# Patient Record
Sex: MALE | Race: BLACK OR AFRICAN AMERICAN | NOT HISPANIC OR LATINO | URBAN - METROPOLITAN AREA
[De-identification: names, ages, dates, MRNs, and addresses within clinical notes are randomized per-mention and may not be internally consistent; named-entity substitution may affect disease eponyms.]

---

## 2025-07-17 ENCOUNTER — OFFICE VISIT (OUTPATIENT)
Dept: URGENT CARE | Age: 65
End: 2025-07-17
Payer: OTHER GOVERNMENT

## 2025-07-17 ENCOUNTER — ANCILLARY PROCEDURE (OUTPATIENT)
Dept: URGENT CARE | Age: 65
End: 2025-07-17
Payer: OTHER GOVERNMENT

## 2025-07-17 VITALS
SYSTOLIC BLOOD PRESSURE: 144 MMHG | OXYGEN SATURATION: 97 % | DIASTOLIC BLOOD PRESSURE: 69 MMHG | RESPIRATION RATE: 20 BRPM | TEMPERATURE: 98.1 F | HEART RATE: 71 BPM

## 2025-07-17 DIAGNOSIS — J02.9 SORE THROAT: ICD-10-CM

## 2025-07-17 DIAGNOSIS — R09.89 CHEST CONGESTION: ICD-10-CM

## 2025-07-17 DIAGNOSIS — J30.9 ALLERGIC RHINITIS, UNSPECIFIED SEASONALITY, UNSPECIFIED TRIGGER: Primary | ICD-10-CM

## 2025-07-17 PROBLEM — R42 DIZZINESS: Status: ACTIVE | Noted: 2021-10-05

## 2025-07-17 PROBLEM — E55.9 VITAMIN D DEFICIENCY: Status: ACTIVE | Noted: 2025-07-17

## 2025-07-17 PROBLEM — E78.5 HYPERLIPEMIA: Status: ACTIVE | Noted: 2025-07-17

## 2025-07-17 PROBLEM — H81.10 BENIGN PAROXYSMAL POSITIONAL VERTIGO: Status: ACTIVE | Noted: 2021-10-05

## 2025-07-17 PROBLEM — I10 BENIGN ESSENTIAL HYPERTENSION: Status: ACTIVE | Noted: 2025-07-17

## 2025-07-17 PROBLEM — K21.9 GERD (GASTROESOPHAGEAL REFLUX DISEASE): Status: ACTIVE | Noted: 2025-07-17

## 2025-07-17 PROBLEM — I65.29 CAROTID ARTERY STENOSIS: Status: ACTIVE | Noted: 2021-10-05

## 2025-07-17 PROBLEM — G43.909 MIGRAINES: Status: ACTIVE | Noted: 2025-07-17

## 2025-07-17 PROBLEM — H93.A9 SUBJECTIVE PULSATILE TINNITUS: Status: ACTIVE | Noted: 2021-10-05

## 2025-07-17 LAB
POC HUMAN RHINOVIRUS PCR: NEGATIVE
POC INFLUENZA A VIRUS PCR: NEGATIVE
POC INFLUENZA B VIRUS PCR: NEGATIVE
POC RESPIRATORY SYNCYTIAL VIRUS PCR: NEGATIVE
POC STREPTOCOCCUS PYOGENES (GROUP A STREP) PCR: NEGATIVE

## 2025-07-17 PROCEDURE — 71046 X-RAY EXAM CHEST 2 VIEWS: CPT | Performed by: NURSE PRACTITIONER

## 2025-07-17 RX ORDER — SUMATRIPTAN SUCCINATE 100 MG/1
TABLET ORAL
COMMUNITY

## 2025-07-17 RX ORDER — ZOLPIDEM TARTRATE 10 MG/1
1 TABLET ORAL NIGHTLY PRN
COMMUNITY
Start: 2015-12-16

## 2025-07-17 RX ORDER — METOPROLOL SUCCINATE 100 MG/1
1 TABLET, EXTENDED RELEASE ORAL DAILY
COMMUNITY
Start: 2015-04-09

## 2025-07-17 RX ORDER — ESOMEPRAZOLE MAGNESIUM 40 MG/1
1 CAPSULE, DELAYED RELEASE ORAL DAILY
COMMUNITY
Start: 2015-12-19

## 2025-07-17 RX ORDER — AMLODIPINE BESYLATE 10 MG/1
1 TABLET ORAL DAILY
COMMUNITY
Start: 2015-10-04

## 2025-07-17 RX ORDER — CYCLOBENZAPRINE HCL 10 MG
1 TABLET ORAL NIGHTLY PRN
COMMUNITY

## 2025-07-17 RX ORDER — SERTRALINE HYDROCHLORIDE 50 MG/1
50 TABLET, FILM COATED ORAL DAILY
COMMUNITY

## 2025-07-17 RX ORDER — SPIRONOLACTONE 25 MG/1
25 TABLET ORAL DAILY
COMMUNITY

## 2025-07-17 RX ORDER — METHYLPREDNISOLONE 4 MG/1
TABLET ORAL
Qty: 21 TABLET | Refills: 0 | Status: SHIPPED | OUTPATIENT
Start: 2025-07-17 | End: 2025-07-23

## 2025-07-17 RX ORDER — SERTRALINE HYDROCHLORIDE 25 MG/1
25 TABLET, FILM COATED ORAL DAILY
COMMUNITY

## 2025-07-17 RX ORDER — TOPIRAMATE 100 MG/1
1 TABLET, FILM COATED ORAL NIGHTLY
COMMUNITY
Start: 2016-02-25

## 2025-07-17 ASSESSMENT — ENCOUNTER SYMPTOMS
SINUS COMPLAINT: 1
LOSS OF SENSATION IN FEET: 0
SINUS PRESSURE: 1
APPETITE CHANGE: 0
SORE THROAT: 1
NUMBNESS: 0
NAUSEA: 0
HEADACHES: 0
DEPRESSION: 0
COUGH: 0
FEVER: 0
VOMITING: 0
SHORTNESS OF BREATH: 0
PHOTOPHOBIA: 0
SINUS PAIN: 0
ARTHRALGIAS: 0
DIZZINESS: 0
ACTIVITY CHANGE: 0
OCCASIONAL FEELINGS OF UNSTEADINESS: 0
DIARRHEA: 0
RHINORRHEA: 1
CHILLS: 0
FATIGUE: 0
ABDOMINAL PAIN: 0
MYALGIAS: 0

## 2025-07-17 ASSESSMENT — PATIENT HEALTH QUESTIONNAIRE - PHQ9
SUM OF ALL RESPONSES TO PHQ9 QUESTIONS 1 AND 2: 0
1. LITTLE INTEREST OR PLEASURE IN DOING THINGS: NOT AT ALL
2. FEELING DOWN, DEPRESSED OR HOPELESS: NOT AT ALL

## 2025-07-17 NOTE — PATIENT INSTRUCTIONS
Thank you for letting me care for you today.  You have been seen today for worsening allergy symptoms  Start steroid pack , continue flonase and Allegra   Please follow up with your primary care provider/pediatrician as directed.   If one is needed, please call [phone].  Please seek care in emergency room for red flags as discussed during visit.

## 2025-07-17 NOTE — PROGRESS NOTES
"Subjective   Patient ID: Zeyad Cristina is a 64 y.o. male. They present today with a chief complaint of Sinus Problem (Pt presents with what he calls a sinus infection, said he has a itchy throat and said he is vibrating when he breaths and sore throat and congestion. ), Sore Throat, Nasal Congestion, and URI.    History of Present Illness  64-year-old male with a medical history of, limited to, seasonal allergies presents to the clinic today for evaluation of a 4-day complaint of worsening allergy symptoms.  Patient endorses worsening nasal congestion, runny nose, sore throat.  Patient states when he lays flat he hears a \"vibration\" in the upper left part of his left chest.  Patient states he is coughing at night.  Takes Flonase daily.  Had to start taking Allegra due to his current symptoms.  Denies chest pain, shortness of breath, dyspnea on exertion, wheezing, orthopnea, pedal edema.  Denies change in bowel or bladder function.  Denies GI complaints.      Sinus Problem  Associated symptoms: congestion, rhinorrhea and sore throat    Associated symptoms: no abdominal pain, no chest pain, no cough, no diarrhea, no ear pain, no fatigue, no fever, no headaches, no myalgias, no nausea, no rash, no shortness of breath and no vomiting    Sore Throat   Associated symptoms include congestion. Pertinent negatives include no abdominal pain, coughing, diarrhea, ear pain, headaches, shortness of breath or vomiting.   URI  Presenting symptoms: congestion, rhinorrhea and sore throat    Presenting symptoms: no cough, no ear pain, no fatigue and no fever    Associated symptoms: no arthralgias, no headaches, no myalgias and no sinus pain        Past Medical History  Allergies as of 07/17/2025 - Reviewed 07/17/2025   Allergen Reaction Noted    Nsaids (non-steroidal anti-inflammatory drug) Bleeding 07/17/2025       Prescriptions Prior to Admission[1]     Medical History[2]    Surgical History[3]     reports that he has never " smoked. He has never been exposed to tobacco smoke. He has never used smokeless tobacco. He reports that he does not drink alcohol and does not use drugs.    Review of Systems  Review of Systems   Constitutional:  Negative for activity change, appetite change, chills, fatigue and fever.   HENT:  Positive for congestion, rhinorrhea, sinus pressure and sore throat. Negative for ear pain and sinus pain.    Eyes:  Negative for photophobia and visual disturbance.   Respiratory:  Negative for cough and shortness of breath.    Cardiovascular:  Negative for chest pain and leg swelling.   Gastrointestinal:  Negative for abdominal pain, diarrhea, nausea and vomiting.   Musculoskeletal:  Negative for arthralgias and myalgias.   Skin:  Negative for rash.   Neurological:  Negative for dizziness, numbness and headaches.   All other systems reviewed and are negative.                                 Objective    Vitals:    07/17/25 1003   BP: 144/69   BP Location: Left arm   Patient Position: Sitting   Pulse: 71   Resp: 20   Temp: 36.7 °C (98.1 °F)   SpO2: 97%     No LMP for male patient.    Physical Exam  Vitals reviewed.   Constitutional:       Appearance: Normal appearance.   HENT:      Head: Normocephalic and atraumatic.      Right Ear: Tympanic membrane, ear canal and external ear normal.      Left Ear: Tympanic membrane, ear canal and external ear normal.      Nose: Mucosal edema, congestion and rhinorrhea present. Rhinorrhea is clear.      Right Turbinates: Enlarged and swollen.      Left Turbinates: Enlarged and swollen.      Right Sinus: No maxillary sinus tenderness or frontal sinus tenderness.      Left Sinus: No maxillary sinus tenderness or frontal sinus tenderness.      Mouth/Throat:      Mouth: Mucous membranes are moist.      Pharynx: Posterior oropharyngeal erythema and postnasal drip present. No oropharyngeal exudate.     Eyes:      Extraocular Movements: Extraocular movements intact.      Conjunctiva/sclera:  Conjunctivae normal.      Pupils: Pupils are equal, round, and reactive to light.       Cardiovascular:      Rate and Rhythm: Normal rate and regular rhythm.      Pulses: Normal pulses.      Heart sounds: Normal heart sounds.   Pulmonary:      Effort: Pulmonary effort is normal.      Breath sounds: Normal breath sounds.   Abdominal:      General: Bowel sounds are normal. There is no distension.     Musculoskeletal:         General: Normal range of motion.      Cervical back: Normal range of motion and neck supple.     Skin:     General: Skin is warm and dry.      Capillary Refill: Capillary refill takes less than 2 seconds.     Neurological:      General: No focal deficit present.      Mental Status: He is alert and oriented to person, place, and time.     Psychiatric:         Mood and Affect: Mood normal.         Behavior: Behavior normal.         Procedures    Point of Care Test & Imaging Results from this visit  Results for orders placed or performed in visit on 07/17/25   POCT SPOTFIRE R/ST Panel Mini w/Strep A ([facility]) manually resulted   Result Value Ref Range    POC Group A Strep, PCR Negative Negative    POC Respiratory Syncytial Virus PCR Negative Negative    POC Influenza A Virus PCR Negative Negative    POC Influenza B Virus PCR Negative Negative    POC Human Rhinovirus PCR Negative Negative      Imaging  XR chest 2 views  Result Date: 7/17/2025  No acute cardiopulmonary process.   MACRO: None   Signed by: Melissa Oconnor 7/17/2025 11:06 AM Dictation workstation:   [id number]      Cardiology, Vascular, and Other Imaging  No other imaging results found for the past 2 days      Diagnostic study results (if any) were reviewed by Kristin L Schoenlein, APRN-CNP.    Assessment/Plan   Allergies, medications, history, and pertinent labs/EKGs/Imaging reviewed by Kristin L Schoenlein, APRN-CNP.     Medical Decision Making  VSS, NAD, Nontoxic appearing. Physical exam as documented above.  In office testing  negative.  Two-view chest performed.  Images have been reviewed by myself and radiology as documented above. Results have been discussed with patient/guardian at this time.    Symptoms, history, and exam are consistent with: Exacerbation of allergic rhinitis.  Will try Medrol Dosepak.  Supportive care, Follow up, and Strict ED precautions reviewed.     Differential Dx include, however, are not limited to: Nasopharyngitis, sinusitis, pneumonia    I have a low suspicion for any acute pathologies requiring emergent evaluation and further workup at this time.  I believe patient is safe to discharge home with a low threshold for emergency room as discussed during visit.  Medication(s) profile of OTC and Rxed medication(s) if prescribed was (were) reviewed.  All questions answered and addressed.  Patient verbalized understanding.      Orders and Diagnoses  Diagnoses and all orders for this visit:  Allergic rhinitis, unspecified seasonality, unspecified trigger  -     methylPREDNISolone (Medrol Dospak) 4 mg tablets; Follow schedule on package instructions  Sore throat  -     POCT SPOTFIRE R/ST Panel Mini w/Strep A ([facility]) manually resulted  Chest congestion  -     XR chest 2 views; Future      Medical Admin Record      Patient disposition: Home    Electronically signed by Kristin L Schoenlein, APRN-CNP  11:07 AM           [1] (Not in a hospital admission)   [2]   Past Medical History:  Diagnosis Date    Other intervertebral disc degeneration, lumbosacral region     Disc degeneration, lumbosacral    Other intervertebral disc displacement, lumbar region     Lumbar herniated disc    Personal history of other diseases of the circulatory system     History of hypertension   [3]   Past Surgical History:  Procedure Laterality Date    OTHER SURGICAL HISTORY  03/30/2015    Surgery Epididymis Aspiration Of Spermatocele    TONSILLECTOMY  03/30/2015    Tonsillectomy    VASECTOMY  03/30/2015    Surgery Vas Deferens Vasectomy